# Patient Record
Sex: FEMALE | Race: OTHER | NOT HISPANIC OR LATINO | Employment: UNEMPLOYED | URBAN - METROPOLITAN AREA
[De-identification: names, ages, dates, MRNs, and addresses within clinical notes are randomized per-mention and may not be internally consistent; named-entity substitution may affect disease eponyms.]

---

## 2020-02-25 ENCOUNTER — APPOINTMENT (OUTPATIENT)
Dept: RADIOLOGY | Facility: CLINIC | Age: 60
End: 2020-02-25
Payer: COMMERCIAL

## 2020-02-25 ENCOUNTER — OFFICE VISIT (OUTPATIENT)
Dept: OBGYN CLINIC | Facility: CLINIC | Age: 60
End: 2020-02-25
Payer: COMMERCIAL

## 2020-02-25 VITALS
RESPIRATION RATE: 18 BRPM | HEART RATE: 76 BPM | BODY MASS INDEX: 32.39 KG/M2 | HEIGHT: 62 IN | WEIGHT: 176 LBS | SYSTOLIC BLOOD PRESSURE: 114 MMHG | DIASTOLIC BLOOD PRESSURE: 71 MMHG

## 2020-02-25 DIAGNOSIS — M25.511 RIGHT SHOULDER PAIN, UNSPECIFIED CHRONICITY: ICD-10-CM

## 2020-02-25 DIAGNOSIS — M54.12 RADICULOPATHY, CERVICAL REGION: Primary | ICD-10-CM

## 2020-02-25 PROCEDURE — 73030 X-RAY EXAM OF SHOULDER: CPT

## 2020-02-25 PROCEDURE — 99203 OFFICE O/P NEW LOW 30 MIN: CPT | Performed by: ORTHOPAEDIC SURGERY

## 2020-02-25 RX ORDER — EMPAGLIFLOZIN 25 MG/1
TABLET, FILM COATED ORAL
COMMUNITY
Start: 2020-02-11

## 2020-02-25 RX ORDER — GLIMEPIRIDE 1 MG/1
1 TABLET ORAL
COMMUNITY
Start: 2020-01-31

## 2020-02-25 RX ORDER — LOSARTAN POTASSIUM 100 MG/1
100 TABLET ORAL DAILY
COMMUNITY
Start: 2020-02-04

## 2020-02-25 RX ORDER — HYDROCHLOROTHIAZIDE 12.5 MG/1
12.5 TABLET ORAL DAILY
COMMUNITY
Start: 2020-02-04

## 2020-02-25 RX ORDER — SIMVASTATIN 10 MG
10 TABLET ORAL EVERY EVENING
COMMUNITY
Start: 2020-01-31

## 2020-02-25 RX ORDER — OMEPRAZOLE 20 MG/1
20 CAPSULE, DELAYED RELEASE ORAL DAILY
COMMUNITY
Start: 2020-01-31

## 2020-02-25 RX ORDER — SITAGLIPTIN AND METFORMIN HYDROCHLORIDE 1000; 50 MG/1; MG/1
1 TABLET, FILM COATED ORAL 2 TIMES DAILY WITH MEALS
COMMUNITY
Start: 2020-02-11

## 2020-02-25 RX ORDER — MELOXICAM 15 MG/1
15 TABLET ORAL DAILY
Qty: 30 TABLET | Refills: 0 | Status: SHIPPED | OUTPATIENT
Start: 2020-02-25 | End: 2020-03-19

## 2020-02-25 NOTE — PROGRESS NOTES
HPI:  Patient is a 61y o  year old RHD female who presents with chief complaint of right shoulder pain that radiates to her elbow  Pt states that her pain increases when she reaches behind her back and lifts heavy objects  Patient states that this pain has been going on for approximately 5 months  Patient states that at times she has more motion than others  Patient states that she also has pain on the right side of her neck with motion  Patient states she is taking ibuprofen for pain which often relieves her symptoms  Patient denies injury  Patient denies previous surgery  Patient denies numbness and tingling        Patient is diabetic    ROS:   General: No fever, no chills, no weight loss, no weight gain  HEENT:  No loss of hearing, no nose bleeds, no sore throat  Eyes:  No eye pain, no red eyes, no visual disturbance  Respiratory:  No cough, no shortness of breath, no wheezing  Cardiovascular:  No chest pain, no palpitations, no edema  GI: No abdominal pain, no nausea, no vomiting  Endocrine: No frequent urination, no excessive thirst  Urinary:  No dysuria, no hematuria, no incontinence  Musculoskeletal: see HPI and PE  Skin:  No rash, no wounds  Neurological:  No dizziness, no headache, no numbness  Psychiatric:  No difficulty concentrating, no depression, no suicide thoughts, no anxiety  Review of all other systems is negative    PMH:  Past Medical History:   Diagnosis Date    Diabetes insipidus ([facility] [address] )     Hypertension     Stomach disorder        PSH:  Past Surgical History:   Procedure Laterality Date    HAND SURGERY Right 2010       Medications:  Current Outpatient Medications   Medication Sig Dispense Refill    aspirin 81 MG tablet Take 81 mg by mouth daily      glimepiride (AMARYL) 1 mg tablet Take 1 mg by mouth daily before breakfast      hydrochlorothiazide (HYDRODIURIL) 12 5 mg tablet Take 12 5 mg by mouth daily      JANUMET  MG per tablet Take 1 tablet by mouth 2 (two) times a day with meals      JARDIANCE 25 MG TABS TAKE 25 MG BY MOUTH DAILY   losartan (COZAAR) 100 MG tablet Take 100 mg by mouth daily      omeprazole (PriLOSEC) 20 mg delayed release capsule Take 20 mg by mouth daily      simvastatin (ZOCOR) 10 mg tablet Take 10 mg by mouth every evening      meloxicam (MOBIC) 15 mg tablet Take 1 tablet (15 mg total) by mouth daily 30 tablet 0     No current facility-administered medications for this visit  Allergies:  No Known Allergies    Family History:  Family History   Problem Relation Age of Onset    Cancer Mother     Diabetes Father     Hypertension Father        Social History:  Social History     Occupational History    Not on file   Tobacco Use    Smoking status: Never Smoker    Smokeless tobacco: Never Used   Substance and Sexual Activity    Alcohol use: Never     Frequency: Never    Drug use: Never    Sexual activity: Yes     Partners: Male       Physical Exam:  General :  Alert, cooperative, no distress, appears stated age  Blood pressure 114/71, pulse 76, resp  rate 18, height 5' 2" (1 575 m), weight 79 8 kg (176 lb)  Head:  Normocephalic, without obvious abnormality, atraumatic   Eyes:  Conjunctiva/corneas clear, EOM's intact,   Ears: Both ears normal appearance, no hearing deficits      Nose: Nares normal, septum midline, no drainage    Neck: Supple,  trachea midline, no adenopathy, no tenderness, no mass   Back:   Symmetric, no curvature, ROM normal, no tenderness   Lungs:   Respirations unlabored   Chest Wall:  No tenderness or deformity   Extremities: Extremities normal, atraumatic, no cyanosis or edema      Pulses: 2+ and symmetric   Skin: Skin color, texture, turgor normal, no rashes or lesions      Neurologic: Normal           Right Shoulder Exam     Comments:  Trapezius pain with resisted external rotation and internal rotation  No pain with abduction  5/5 strength with internal rotation external rotation and abduction  Reflexes 1+ bilaterally  Trapezius pain with motion of the neck  5/5 strength with flexion of the elbow  4/5 strength with extension of the elbow with right elbow pain  No pain with active motion of the elbow  5/5 strength  wrist flexion and extension  Positive abduction relief sign  Complain of trapezial discomfort with Pop test             Imaging Studies: The following imaging studies were reviewed in office today  My findings are noted  X-rays of the right shoulder show no acute fractures or dislocations, no significant degenerative changes    Assessment  Encounter Diagnoses   Name Primary?  Right shoulder pain, unspecified chronicity     Radiculopathy, cervical region Yes         Plan:    X-rays were reviewed and discussed with patient  After exam patient was advised that most of her pain is likely coming from cervical radiculopathy  Therapy order was placed for trapezius pain as well as cervical radiculopathy  Patient was advised to take order with her to previously scheduled therapy  15 mg Mobic was ordered for patient to take once daily  Patient will follow up in the office with Dr Aaron Roy for evaluation of her cervical spine    Patient will follow up in the office in 4 weeks in regards to her trapezius pain      Scribe Attestation    I,:   Kristen Reyes am acting as a scribe while in the presence of the attending physician :        I,:   Mu Burleson MD personally performed the services described in this documentation    as scribed in my presence :

## 2020-02-25 NOTE — LETTER
February 26, 2020     Patti Blanc MD  [address] [facility]    Patient: Betsy Abreu   YOB: 1960   Date of Visit: 2/25/2020       Dear Dr Valentín Ruiz: Thank you for referring Betsy Abreu to me for evaluation  Below are my notes for this consultation  If you have questions, please do not hesitate to call me  I look forward to following your patient along with you  Sincerely,        Yohan Mares MD        CC: No Recipients  Yohan Mares MD  2/25/2020  5:18 PM  Signed  HPI:  Patient is a 61y o  year old RHD female who presents with chief complaint of right shoulder pain that radiates to her elbow  Pt states that her pain increases when she reaches behind her back and lifts heavy objects  Patient states that this pain has been going on for approximately 5 months  Patient states that at times she has more motion than others  Patient states that she also has pain on the right side of her neck with motion  Patient states she is taking ibuprofen for pain which often relieves her symptoms  Patient denies injury  Patient denies previous surgery  Patient denies numbness and tingling        Patient is diabetic    ROS:   General: No fever, no chills, no weight loss, no weight gain  HEENT:  No loss of hearing, no nose bleeds, no sore throat  Eyes:  No eye pain, no red eyes, no visual disturbance  Respiratory:  No cough, no shortness of breath, no wheezing  Cardiovascular:  No chest pain, no palpitations, no edema  GI: No abdominal pain, no nausea, no vomiting  Endocrine: No frequent urination, no excessive thirst  Urinary:  No dysuria, no hematuria, no incontinence  Musculoskeletal: see HPI and PE  Skin:  No rash, no wounds  Neurological:  No dizziness, no headache, no numbness  Psychiatric:  No difficulty concentrating, no depression, no suicide thoughts, no anxiety  Review of all other systems is negative    PMH:  Past Medical History:   Diagnosis Date    [facility] insipidus ([facility] [address] )     Hypertension     Stomach disorder        PSH:  Past Surgical History:   Procedure Laterality Date    HAND SURGERY Right 2010       Medications:  Current Outpatient Medications   Medication Sig Dispense Refill    aspirin 81 MG tablet Take 81 mg by mouth daily      glimepiride (AMARYL) 1 mg tablet Take 1 mg by mouth daily before breakfast      hydrochlorothiazide (HYDRODIURIL) 12 5 mg tablet Take 12 5 mg by mouth daily      JANUMET  MG per tablet Take 1 tablet by mouth 2 (two) times a day with meals      JARDIANCE 25 MG TABS TAKE 25 MG BY MOUTH DAILY   losartan (COZAAR) 100 MG tablet Take 100 mg by mouth daily      omeprazole (PriLOSEC) 20 mg delayed release capsule Take 20 mg by mouth daily      simvastatin (ZOCOR) 10 mg tablet Take 10 mg by mouth every evening      meloxicam (MOBIC) 15 mg tablet Take 1 tablet (15 mg total) by mouth daily 30 tablet 0     No current facility-administered medications for this visit  Allergies:  No Known Allergies    Family History:  Family History   Problem Relation Age of Onset    Cancer Mother     Diabetes Father     Hypertension Father        Social History:  Social History     Occupational History    Not on file   Tobacco Use    Smoking status: Never Smoker    Smokeless tobacco: Never Used   Substance and Sexual Activity    Alcohol use: Never     Frequency: Never    Drug use: Never    Sexual activity: Yes     Partners: Male       Physical Exam:  General :  Alert, cooperative, no distress, appears stated age  Blood pressure 114/71, pulse 76, resp  rate 18, height 5' 2" (1 575 m), weight 79 8 kg (176 lb)  Head:  Normocephalic, without obvious abnormality, atraumatic   Eyes:  Conjunctiva/corneas clear, EOM's intact,   Ears: Both ears normal appearance, no hearing deficits      Nose: Nares normal, septum midline, no drainage    Neck: Supple,  trachea midline, no adenopathy, no tenderness, no mass   Back:   Symmetric, no curvature, ROM normal, no tenderness   Lungs:   Respirations unlabored   Chest Wall:  No tenderness or deformity   Extremities: Extremities normal, atraumatic, no cyanosis or edema      Pulses: 2+ and symmetric   Skin: Skin color, texture, turgor normal, no rashes or lesions      Neurologic: Normal           Right Shoulder Exam     Comments:  Trapezius pain with resisted external rotation and internal rotation  No pain with abduction  5/5 strength with internal rotation external rotation and abduction  Reflexes 1+ bilaterally  Trapezius pain with motion of the neck  5/5 strength with flexion of the elbow  4/5 strength with extension of the elbow with right elbow pain  No pain with active motion of the elbow  5/5 strength  wrist flexion and extension  Positive abduction relief sign  Complain of trapezial discomfort with Pop test             Imaging Studies: The following imaging studies were reviewed in office today  My findings are noted  X-rays of the right shoulder show no acute fractures or dislocations, no significant degenerative changes    Assessment  Encounter Diagnoses   Name Primary?  Right shoulder pain, unspecified chronicity     Radiculopathy, cervical region Yes         Plan:    X-rays were reviewed and discussed with patient  After exam patient was advised that most of her pain is likely coming from cervical radiculopathy  Therapy order was placed for trapezius pain as well as cervical radiculopathy  Patient was advised to take order with her to previously scheduled therapy  15 mg Mobic was ordered for patient to take once daily  Patient will follow up in the office with Dr Jesus Carmen for evaluation of her cervical spine    Patient will follow up in the office in 4 weeks in regards to her trapezius pain      Scribe Attestation    I,:   Telma Polo am acting as a scribe while in the presence of the attending physician :        I,:   Betty Fajardo MD personally performed the services described in this documentation    as scribed in my presence :

## 2020-02-27 ENCOUNTER — EVALUATION (OUTPATIENT)
Dept: PHYSICAL THERAPY | Facility: CLINIC | Age: 60
End: 2020-02-27
Payer: COMMERCIAL

## 2020-02-27 DIAGNOSIS — G89.29 CHRONIC RIGHT SHOULDER PAIN: Primary | ICD-10-CM

## 2020-02-27 DIAGNOSIS — M25.511 CHRONIC RIGHT SHOULDER PAIN: Primary | ICD-10-CM

## 2020-02-27 PROCEDURE — 97162 PT EVAL MOD COMPLEX 30 MIN: CPT | Performed by: PHYSICAL THERAPIST

## 2020-02-27 PROCEDURE — 97110 THERAPEUTIC EXERCISES: CPT | Performed by: PHYSICAL THERAPIST

## 2020-02-27 NOTE — PROGRESS NOTES
PT Evaluation     Today's date: 2020  Patient name: Tiana Dickey  : 1960  MRN: [id number]  Referring provider: Eusebio Driver,*  Dx:   Encounter Diagnosis     ICD-10-CM    1  Chronic right shoulder pain M25 511     G89 29        Start Time: 1400  Stop Time: 1435  Total time in clinic (min): 35 minutes    Assessment  Assessment details: Pt is a 60 y/o female presenting to physical therapy with chief complaint of R shoulder pain that began about 5 months ago after moving  Pt has decreased R shoulder A/PROM and strength  Her lift-off and full can tests are (+), suggesting a rotator cuff pathology, and a (+) Kareem's suggesting impingement  Pt's poor posture is likely contributing to her impingement  Pt would benefit from physical therapy in order to improve pain, A/PROM, strength, TTP, and overall function to return to PLOF  Impairments: abnormal or restricted ROM, activity intolerance, impaired physical strength, lacks appropriate home exercise program, pain with function and poor posture   Functional limitations: lifting, carrying, housework  Symptom irritability: moderateUnderstanding of Dx/Px/POC: good   Prognosis: good    Goals  STG: 3 weeks  1  Pt will demonstrate independence with HEP  2  Pt will improve R shoulder AROM by at least 10%  3  Pt will improve R shoulder strength by at least 1/2 grade  4  Pt will report pain no more than 5/10  5  Pt will have minimal TTP over R supraspinatus tendon    LT weeks  1  Pt will improve R shoulder AROM to equal that on the L to return to PLOF  2  Pt will improve R shoulder strength to at least 4+/5  3  Pt will be able to perform heavy household chores without limitations  4  Pt will have no difficulty reaching OH  5   Pt will report pain no more than 2/10      Plan  Patient would benefit from: skilled physical therapy  Planned modality interventions: cryotherapy and thermotherapy: hydrocollator packs  Planned therapy interventions: therapeutic exercise, therapeutic activities, stretching, strengthening, patient education, neuromuscular re-education, massage, manual therapy, balance, gait training and home exercise program  Frequency: 2x week  Duration in weeks: 6  Treatment plan discussed with: patient        Subjective Evaluation    History of Present Illness  Mechanism of injury: Pt reports her R shoulder pain began about 5 months ago when she was moving into her new house  Pt reports that heavy household work increases her shoulder pain  Pt reports she can reach [address] a little bit, but reaching too high is painful and difficult  She reports she is able to lay on the R side but it is painful  Recurrent probem    Quality of life: good    Pain  Current pain ratin  At best pain ratin  At worst pain ratin  Quality: discomfort, dull ache and burning  Relieving factors: medications  Aggravating factors: lifting and overhead activity  Progression: no change    Patient Goals  Patient goals for therapy: decreased pain, independence with ADLs/IADLs, increased strength and increased motion          Objective     Tenderness     Right Shoulder  Tenderness in the infraspinatus tendon and supraspinatus tendon  Cervical/Thoracic Screen   Cervical range of motion within normal limits with the following exceptions: Cervical AROM full and painfree  Cervical joint play WNL and minimally TTP on the R    Active Range of Motion   Left Shoulder   Flexion: 145 degrees   Abduction: WFL  External rotation 0°: 35 degrees     Right Shoulder   Flexion: 140 degrees with pain  Abduction: 123 degrees with pain  External rotation 0°: 35 degrees     Joint Play     Right Shoulder  Joints within functional limits are the posterior capsule and inferior capsule       Additional Joint Play Details  Painful with A-P GH joint mobs    Strength/Myotome Testing     Left Shoulder   Normal muscle strength    Right Shoulder     Planes of Motion   Flexion: 4-   Abduction: 4- External rotation at 0°: 4-   Internal rotation at 0°: 4-     Tests     Right Shoulder   Positive full can, lift-off and Kareem's  Negative belly press                Precautions: DM, HTN      Manual  2/27            R shoulder PROM             UT STM 5'                                                       Exercise Diary  2/27            Pulleys             UBE             TB rows/ext             [facility] flex/abd/er                                                                                                                                                                                                                                 Modalities

## 2020-02-27 NOTE — LETTER
2020    Mariama Ernst MD  [address] [facility]    Patient: Marcos Alvarenga   YOB: 1960   Date of Visit: 2020     Encounter Diagnosis     ICD-10-CM    1  Chronic right shoulder pain M25 511     G89 29        Dear Dr Lluvia Burch: Thank you for your recent referral of Marcos Alvarenga  Please review the attached evaluation summary from [address] [place] [address] recent visit  Please verify that you agree with the plan of care by signing the attached order  If you have any questions or concerns, please do not hesitate to call  I sincerely appreciate the opportunity to share in the care of one of your patients and hope to have another opportunity to work with you in the near future  Sincerely,    Chasidy Oconnor, PT      Referring Provider:      I certify that I have read the below Plan of Care and certify the need for these services furnished under this plan of treatment while under my care  Mariama Ernst MD  [address]   [address] [place], [place] [address]vard: [phone]          PT Evaluation     Today's date: 2020  Patient name: Marcos Alvarenga  : 1960  MRN: [id number]  Referring provider: Elsie Jackson,*  Dx:   Encounter Diagnosis     ICD-10-CM    1  Chronic right shoulder pain M25 511     G89 29        Start Time: 1400  Stop Time: 1435  Total time in clinic (min): 35 minutes    Assessment  Assessment details: Pt is a 62 y/o female presenting to physical therapy with chief complaint of R shoulder pain that began about 5 months ago after moving  Pt has decreased R shoulder A/PROM and strength  Her lift-off and full can tests are (+), suggesting a rotator cuff pathology, and a (+) Kareem's suggesting impingement  Pt's poor posture is likely contributing to her impingement  Pt would benefit from physical therapy in order to improve pain, A/PROM, strength, TTP, and overall function to return to PLOF    Impairments: abnormal or restricted ROM, activity intolerance, impaired physical strength, lacks appropriate home exercise program, pain with function and poor posture   Functional limitations: lifting, carrying, housework  Symptom irritability: moderateUnderstanding of Dx/Px/POC: good   Prognosis: good    Goals  STG: 3 weeks  1  Pt will demonstrate independence with HEP  2  Pt will improve R shoulder AROM by at least 10%  3  Pt will improve R shoulder strength by at least 1/2 grade  4  Pt will report pain no more than 5/10  5  Pt will have minimal TTP over R supraspinatus tendon    LT weeks  1  Pt will improve R shoulder AROM to equal that on the L to return to PLOF  2  Pt will improve R shoulder strength to at least 4+/5  3  Pt will be able to perform heavy household chores without limitations  4  Pt will have no difficulty reaching OH  5  Pt will report pain no more than 2/10      Plan  Patient would benefit from: skilled physical therapy  Planned modality interventions: cryotherapy and thermotherapy: hydrocollator packs  Planned therapy interventions: therapeutic exercise, therapeutic activities, stretching, strengthening, patient education, neuromuscular re-education, massage, manual therapy, balance, gait training and home exercise program  Frequency: 2x week  Duration in weeks: 6  Treatment plan discussed with: patient        Subjective Evaluation    History of Present Illness  Mechanism of injury: Pt reports her R shoulder pain began about 5 months ago when she was moving into her new house  Pt reports that heavy household work increases her shoulder pain  Pt reports she can reach [facility] a little bit, but reaching too high is painful and difficult  She reports she is able to lay on the R side but it is painful             Recurrent probem    Quality of life: good    Pain  Current pain ratin  At best pain ratin  At worst pain ratin  Quality: discomfort, dull ache and burning  Relieving factors: medications  Aggravating factors: lifting and overhead activity  Progression: no change    Patient Goals  Patient goals for therapy: decreased pain, independence with ADLs/IADLs, increased strength and increased motion          Objective     Tenderness     Right Shoulder  Tenderness in the infraspinatus tendon and supraspinatus tendon  Cervical/Thoracic Screen   Cervical range of motion within normal limits with the following exceptions: Cervical AROM full and painfree  Cervical joint play WNL and minimally TTP on the R    Active Range of Motion   Left Shoulder   Flexion: 145 degrees   Abduction: WFL  External rotation 0°:  35 degrees     Right Shoulder   Flexion: 140 degrees with pain  Abduction: 123 degrees with pain  External rotation 0°:  35 degrees     Joint Play     Right Shoulder  Joints within functional limits are the posterior capsule and inferior capsule  Additional Joint Play Details  Painful with A-P GH joint mobs    Strength/Myotome Testing     Left Shoulder   Normal muscle strength    Right Shoulder     Planes of Motion   Flexion: 4-   Abduction: 4-   External rotation at 0°:  4-   Internal rotation at 0°:  4-     Tests     Right Shoulder   Positive full can, lift-off and Kareem's  Negative belly press                Precautions: DM, HTN      Manual  2/27            R shoulder PROM             UT STM 5'                                                       Exercise Diary  2/27            Pulleys             UBE             TB rows/ext             [facility] flex/abd/er                                                                                                                                                                                                                                 Modalities

## 2020-03-03 ENCOUNTER — OFFICE VISIT (OUTPATIENT)
Dept: PHYSICAL THERAPY | Facility: CLINIC | Age: 60
End: 2020-03-03
Payer: COMMERCIAL

## 2020-03-03 DIAGNOSIS — G89.29 CHRONIC RIGHT SHOULDER PAIN: Primary | ICD-10-CM

## 2020-03-03 DIAGNOSIS — M25.511 CHRONIC RIGHT SHOULDER PAIN: Primary | ICD-10-CM

## 2020-03-03 PROCEDURE — 97110 THERAPEUTIC EXERCISES: CPT | Performed by: PHYSICAL THERAPIST

## 2020-03-03 PROCEDURE — 97140 MANUAL THERAPY 1/> REGIONS: CPT | Performed by: PHYSICAL THERAPIST

## 2020-03-03 PROCEDURE — 97112 NEUROMUSCULAR REEDUCATION: CPT | Performed by: PHYSICAL THERAPIST

## 2020-03-03 NOTE — PROGRESS NOTES
Daily Note     Today's date: 3/3/2020  Patient name: Andrea Santana  : 1960  MRN: [id number]  Referring provider: Leah Longoria,*  Dx:   Encounter Diagnosis     ICD-10-CM    1  Chronic right shoulder pain M25 511     G89 29        Start Time: 1026  Stop Time: 1057  Total time in clinic (min): 31 minutes    Subjective: "My shoulder is feeling much better"      Objective: See treatment diary below      Assessment: Improvement in tolerance to STM of the R UT today compared to IE  Pt able to achieve full range flex AAROM with cane, however pain limited her with abduction and ER AAROM with cane  Pt able to perform periscapular strengthening exercises without pain  Plan: Progress as tolerated       Precautions: DM, HTN      Manual  2/27 3/3           R shoulder PROM             UT STM 5' 12'                                                      Exercise Diary  2/27 3/3           Pulleys             UBE  3'/3'           TB rows/ext  RTB 20x ea           Cane flex/abd/er  10"x10                                                                                                                                                                                                                               Modalities

## 2020-03-05 ENCOUNTER — OFFICE VISIT (OUTPATIENT)
Dept: PHYSICAL THERAPY | Facility: CLINIC | Age: 60
End: 2020-03-05
Payer: COMMERCIAL

## 2020-03-05 DIAGNOSIS — M25.511 CHRONIC RIGHT SHOULDER PAIN: Primary | ICD-10-CM

## 2020-03-05 DIAGNOSIS — G89.29 CHRONIC RIGHT SHOULDER PAIN: Primary | ICD-10-CM

## 2020-03-05 PROCEDURE — 97112 NEUROMUSCULAR REEDUCATION: CPT

## 2020-03-05 PROCEDURE — 97140 MANUAL THERAPY 1/> REGIONS: CPT

## 2020-03-05 NOTE — PROGRESS NOTES
Daily Note     Today's date: 3/5/2020  Patient name: Sophie Torres  : 1960  MRN: [id number]  Referring provider: Mark Encarnacion,*  Dx:   Encounter Diagnosis     ICD-10-CM    1  Chronic right shoulder pain M25 511     G89 29                   Subjective: Patient states R shoulder is feeling better  Continues to have burning throughout RUE with house chores when lifting RUE overhead  Objective: See treatment diary below      Assessment: Patient was tender to palpation along RUT  Requires verbal and tactile cuing in order to facilitate proper exercise form and dosage  Pt had difficulty activating serratus  No c/o pain or discomfort with cane flexion overhead  Plan: Continue per plan of care        Precautions: DM, HTN      Manual  2/27 3/3 3/5          R shoulder PROM             UT STM 5' 12' 10'                                                     Exercise Diary  2/27 3/3 3/5          Pulleys   3'          UBE  3'/3'           TB rows/ext  RTB 20x ea RTB x20 ea          Cane flex/abd/er  10"x10 10"x          scap wall slides   x20          C/s retractions   x20          Serratus punch   unable                                                                                                                                                                                       Modalities

## 2020-03-09 ENCOUNTER — TRANSCRIBE ORDERS (OUTPATIENT)
Dept: PHYSICAL THERAPY | Facility: CLINIC | Age: 60
End: 2020-03-09

## 2020-03-09 DIAGNOSIS — G89.29 CHRONIC RIGHT SHOULDER PAIN: Primary | ICD-10-CM

## 2020-03-09 DIAGNOSIS — M25.511 CHRONIC RIGHT SHOULDER PAIN: Primary | ICD-10-CM

## 2020-03-10 ENCOUNTER — APPOINTMENT (OUTPATIENT)
Dept: PHYSICAL THERAPY | Facility: CLINIC | Age: 60
End: 2020-03-10
Payer: COMMERCIAL

## 2020-03-12 ENCOUNTER — APPOINTMENT (OUTPATIENT)
Dept: PHYSICAL THERAPY | Facility: CLINIC | Age: 60
End: 2020-03-12
Payer: COMMERCIAL

## 2020-03-17 ENCOUNTER — OFFICE VISIT (OUTPATIENT)
Dept: PHYSICAL THERAPY | Facility: CLINIC | Age: 60
End: 2020-03-17
Payer: COMMERCIAL

## 2020-03-17 DIAGNOSIS — M25.511 CHRONIC RIGHT SHOULDER PAIN: Primary | ICD-10-CM

## 2020-03-17 DIAGNOSIS — G89.29 CHRONIC RIGHT SHOULDER PAIN: Primary | ICD-10-CM

## 2020-03-17 PROCEDURE — 97140 MANUAL THERAPY 1/> REGIONS: CPT

## 2020-03-17 PROCEDURE — 97110 THERAPEUTIC EXERCISES: CPT

## 2020-03-17 PROCEDURE — 97112 NEUROMUSCULAR REEDUCATION: CPT

## 2020-03-17 NOTE — PROGRESS NOTES
Daily Note     Today's date: 3/17/2020  Patient name: Vivienne Gabriel  : 1960  MRN: [id number]  Referring provider: Eris Raygoza,*  Dx:   Encounter Diagnosis     ICD-10-CM    1  Chronic right shoulder pain M25 511     G89 29                   Subjective: Pt states she was in "very much" pain in RUT region yesterday and today  Objective: See treatment diary below      Assessment: Able to perform serratus punch today with decreased difficulty  Pt did require tactile cuing in order to facilitate this motion  Presents with decreased motor control of periscapular and cervical regions  No change in sx post session  Plan: Continue per plan of care        Precautions: DM, HTN      Manual  2/27 3/3 3/5 3/17         R shoulder PROM             UT STM 5' 12' 10' 10'                                                    Exercise Diary  2/27 3/3 3/5 3/17         Pulleys   3'          UBE  3'/3'  5'f/5'b         TB rows/ext  RTB 20x ea RTB x20 ea RTB 20         Cane flex/abd/er  10"x10 10"x 10"x10         scap wall slides   x20 x20         C/s retractions   x20 x20         Serratus punch   unable x20         SL ER    x20                                                                                                                                                                         Modalities

## 2020-03-19 ENCOUNTER — OFFICE VISIT (OUTPATIENT)
Dept: PHYSICAL THERAPY | Facility: CLINIC | Age: 60
End: 2020-03-19
Payer: COMMERCIAL

## 2020-03-19 DIAGNOSIS — M25.511 CHRONIC RIGHT SHOULDER PAIN: Primary | ICD-10-CM

## 2020-03-19 DIAGNOSIS — G89.29 CHRONIC RIGHT SHOULDER PAIN: Primary | ICD-10-CM

## 2020-03-19 DIAGNOSIS — M54.12 RADICULOPATHY, CERVICAL REGION: ICD-10-CM

## 2020-03-19 DIAGNOSIS — M25.511 RIGHT SHOULDER PAIN, UNSPECIFIED CHRONICITY: ICD-10-CM

## 2020-03-19 PROCEDURE — 97112 NEUROMUSCULAR REEDUCATION: CPT | Performed by: PHYSICAL THERAPIST

## 2020-03-19 PROCEDURE — 97140 MANUAL THERAPY 1/> REGIONS: CPT | Performed by: PHYSICAL THERAPIST

## 2020-03-19 PROCEDURE — 97110 THERAPEUTIC EXERCISES: CPT | Performed by: PHYSICAL THERAPIST

## 2020-03-19 RX ORDER — MELOXICAM 15 MG/1
TABLET ORAL
Qty: 30 TABLET | Refills: 0 | Status: SHIPPED | OUTPATIENT
Start: 2020-03-19

## 2020-03-19 NOTE — PROGRESS NOTES
Daily Note     Today's date: 3/19/2020  Patient name: Kamla Herman  : 1960  MRN: [id number]  Referring provider: Tracie Hilliard,*  Dx:   Encounter Diagnosis     ICD-10-CM    1  Chronic right shoulder pain M25 511     G89 29        Start Time: 1057  Stop Time: 1137  Total time in clinic (min): 40 minutes    Subjective: Pt reports she feels like her shoulder is getting better  Objective: See treatment diary below      Assessment: Pt continues to have increased tightness in the R UT, improved with STM  Mod TC for proper form during serratus punches and during rows  Pt's R shoulder AROM improved following STM  Plan: Progress as tolerated       Precautions: DM, HTN      Manual  2/27 3/3 3/5 3/17 3/19        R shoulder PROM             UT STM 5' 12' 10' 10' 15'                                                   Exercise Diary  2/27 3/3 3/5 3/17 3/19        Pulleys   3'          UBE  3'/3'  5'f/5'b 4'/4'        TB rows/ext  RTB 20x ea RTB x20 ea RTB 20 RTB 20x        Cane flex/abd/er  10"x10 10"x 10"x10 10"x10        scap wall slides   x20 x20         C/s retractions   x20 x20 20x        Serratus punch   unable x20 20x        SL ER    x20 20x        TB IR/ER     RTB 20x ea                                                                                                                                                           Modalities

## 2020-03-24 ENCOUNTER — APPOINTMENT (OUTPATIENT)
Dept: PHYSICAL THERAPY | Facility: CLINIC | Age: 60
End: 2020-03-24
Payer: COMMERCIAL

## 2020-03-26 ENCOUNTER — APPOINTMENT (OUTPATIENT)
Dept: PHYSICAL THERAPY | Facility: CLINIC | Age: 60
End: 2020-03-26
Payer: COMMERCIAL

## 2020-03-31 ENCOUNTER — APPOINTMENT (OUTPATIENT)
Dept: PHYSICAL THERAPY | Facility: CLINIC | Age: 60
End: 2020-03-31
Payer: COMMERCIAL

## 2020-04-13 NOTE — PROGRESS NOTES
PT Discharge    Today's date: 2020  Patient name: Tiana Dickey  : 1960  MRN: [id number]  Referring provider: Eusebio Driver,*  Dx:   Encounter Diagnosis     ICD-10-CM    1  Chronic right shoulder pain M25 511     G89 29      Assessment  Assessment details: Pt cancelled all future appointments secondary to concern about COVID-19  Subjective and objective information, and goals, unable to be updated at this time  Pt DC from skilled therapy  Impairments: abnormal or restricted ROM, activity intolerance, impaired physical strength, lacks appropriate home exercise program, pain with function and poor posture   Functional limitations: lifting, carrying, housework  Symptom irritability: moderateUnderstanding of Dx/Px/POC: good   Prognosis: good    Goals  STG: 3 weeks - not met  1  Pt will demonstrate independence with HEP  2  Pt will improve R shoulder AROM by at least 10%  3  Pt will improve R shoulder strength by at least 1/2 grade  4  Pt will report pain no more than 5/10  5  Pt will have minimal TTP over R supraspinatus tendon    LT weeks - not met  1  Pt will improve R shoulder AROM to equal that on the L to return to PLOF  2  Pt will improve R shoulder strength to at least 4+/5  3  Pt will be able to perform heavy household chores without limitations  4  Pt will have no difficulty reaching OH  5   Pt will report pain no more than 2/10      Plan  Patient would benefit from: skilled physical therapy  Planned modality interventions: cryotherapy and thermotherapy: hydrocollator packs  Planned therapy interventions: therapeutic exercise, therapeutic activities, stretching, strengthening, patient education, neuromuscular re-education, massage, manual therapy, balance, gait training and home exercise program  Treatment plan discussed with: patient        Subjective Evaluation    History of Present Illness  Mechanism of injury: Pt reports her R shoulder pain began about 5 months ago when she was moving into her new house  Pt reports that heavy household work increases her shoulder pain  Pt reports she can reach [facility] a little bit, but reaching too high is painful and difficult  She reports she is able to lay on the R side but it is painful  Recurrent probem    Quality of life: good    Pain  Current pain ratin  At best pain ratin  At worst pain ratin  Quality: discomfort, dull ache and burning  Relieving factors: medications  Aggravating factors: lifting and overhead activity  Progression: no change    Patient Goals  Patient goals for therapy: decreased pain, independence with ADLs/IADLs, increased strength and increased motion          Objective     Tenderness     Right Shoulder  Tenderness in the infraspinatus tendon and supraspinatus tendon  Cervical/Thoracic Screen   Cervical range of motion within normal limits with the following exceptions: Cervical AROM full and painfree  Cervical joint play WNL and minimally TTP on the R    Active Range of Motion   Left Shoulder   Flexion: 145 degrees   Abduction: WFL  External rotation 0°: 35 degrees     Right Shoulder   Flexion: 140 degrees with pain  Abduction: 123 degrees with pain  External rotation 0°: 35 degrees     Joint Play     Right Shoulder  Joints within functional limits are the posterior capsule and inferior capsule  Additional Joint Play Details  Painful with A-P GH joint mobs    Strength/Myotome Testing     Left Shoulder   Normal muscle strength    Right Shoulder     Planes of Motion   Flexion: 4-   Abduction: 4-   External rotation at 0°: 4-   Internal rotation at 0°: 4-     Tests     Right Shoulder   Positive full can, lift-off and Kareem's  Negative belly press         Flowsheet Rows      Most Recent Value   PT/OT G-Codes   Current Score  77   Projected Score  65

## 2020-04-27 ENCOUNTER — TELEPHONE (OUTPATIENT)
Dept: OBGYN CLINIC | Facility: CLINIC | Age: 60
End: 2020-04-27

## 2021-07-11 ENCOUNTER — HOSPITAL ENCOUNTER (EMERGENCY)
Facility: HOSPITAL | Age: 61
Discharge: HOME | End: 2021-07-11
Attending: EMERGENCY MEDICINE
Payer: COMMERCIAL

## 2021-07-11 VITALS
SYSTOLIC BLOOD PRESSURE: 142 MMHG | DIASTOLIC BLOOD PRESSURE: 62 MMHG | HEART RATE: 92 BPM | OXYGEN SATURATION: 97 % | RESPIRATION RATE: 17 BRPM | TEMPERATURE: 98.2 F

## 2021-07-11 DIAGNOSIS — H60.501 ACUTE OTITIS EXTERNA OF RIGHT EAR, UNSPECIFIED TYPE: Primary | ICD-10-CM

## 2021-07-11 PROCEDURE — 99283 EMERGENCY DEPT VISIT LOW MDM: CPT

## 2021-07-11 PROCEDURE — 99284 EMERGENCY DEPT VISIT MOD MDM: CPT | Performed by: EMERGENCY MEDICINE

## 2021-07-11 RX ORDER — OXYMETAZOLINE HYDROCHLORIDE 0.05 G/100ML
2 SPRAY NASAL ONCE
Status: COMPLETED | OUTPATIENT
Start: 2021-07-11 | End: 2021-07-11

## 2021-07-11 RX ORDER — NEOMYCIN SULFATE, POLYMYXIN B SULFATE AND HYDROCORTISONE 10; 3.5; 1 MG/ML; MG/ML; [USP'U]/ML
4 SUSPENSION/ DROPS AURICULAR (OTIC) EVERY 8 HOURS SCHEDULED
Status: DISCONTINUED | OUTPATIENT
Start: 2021-07-11 | End: 2021-07-11 | Stop reason: HOSPADM

## 2021-07-11 RX ADMIN — NEOMYCIN SULFATE, POLYMYXIN B SULFATE AND HYDROCORTISONE 4 DROP: 10; 3.5; 1 SUSPENSION/ DROPS AURICULAR (OTIC) at 15:37

## 2021-07-11 RX ADMIN — OXYMETAZOLINE HYDROCHLORIDE 2 SPRAY: 0.05 SPRAY NASAL at 15:37

## 2021-07-11 NOTE — ED PROVIDER NOTES
Pt Name: Ameena Porter  MRN: [id number]  [place] 1960  Age/Sex: 64 y o  female  Date of evaluation: 7/11/2021  PCP: Tamiko Forbes MD    [address] [place] [address]    Chief Complaint   Patient presents with    Fever - 9 weeks to 74 years     pt c/o bilateral earache and fevers  pt not sure how high was the fever  HPI    64 y o  female presenting with bilateral earaches possible fevers  Patient states she began having the symptoms 1-2 days ago, states she has been having some dull burning pain in both ears  She also states she felt like she had a fever although she did not measure her temperature  She denies nausea, vomiting, diarrhea, chest pain, shortness of breath, headache, changes in speech or vision, trauma, other symptoms  HPI      Past Medical and Surgical History    Past Medical History:   Diagnosis Date    Diabetes insipidus ([address] )     Hypertension     Stomach disorder        Past Surgical History:   Procedure Laterality Date    HAND SURGERY Right 2010       Family History   Problem Relation Age of Onset    Cancer Mother     Diabetes Father     Hypertension Father        Social History     Tobacco Use    Smoking status: Never Smoker    Smokeless tobacco: Never Used   Vaping Use    Vaping Use: Never used   Substance Use Topics    Alcohol use: Never    Drug use: Never           Allergies    No Known Allergies    Home Medications    Prior to Admission medications    Medication Sig Start Date End Date Taking?  Authorizing Provider   aspirin 81 MG tablet Take 81 mg by mouth daily    Historical Provider, MD   glimepiride (AMARYL) 1 mg tablet Take 1 mg by mouth daily before breakfast 1/31/20   Historical Provider, MD   hydrochlorothiazide (HYDRODIURIL) 12 5 mg tablet Take 12 5 mg by mouth daily 2/4/20   Historical Provider, MD   JANFAIZAN  MG per tablet Take 1 tablet by mouth 2 (two) times a day with meals 2/11/20   Historical Provider, MD   JARDIANCE 25 MG TABS TAKE 25 MG BY MOUTH DAILY  2/11/20   Historical Provider, MD   losartan (COZAAR) 100 MG tablet Take 100 mg by mouth daily 2/4/20   Historical Provider, MD   meloxicam (MOBIC) 15 mg tablet TAKE 1 TABLET BY MOUTH EVERY DAY 3/19/20   Christiano Moore PA-C   omeprazole (PriLOSEC) 20 mg delayed release capsule Take 20 mg by mouth daily 1/31/20   Historical Provider, MD   simvastatin (ZOCOR) 10 mg tablet Take 10 mg by mouth every evening 1/31/20   Historical Provider, MD           Review of Systems    Review of Systems   Constitutional: Positive for fever  Negative for activity change and chills  HENT: Positive for ear pain  Negative for drooling and facial swelling  Eyes: Negative for pain, discharge and visual disturbance  Respiratory: Negative for apnea, cough, chest tightness, shortness of breath and wheezing  Cardiovascular: Negative for chest pain and leg swelling  Gastrointestinal: Negative for abdominal pain, constipation, diarrhea, nausea and vomiting  Genitourinary: Negative for difficulty urinating, dysuria and urgency  Musculoskeletal: Negative for arthralgias, back pain and gait problem  Skin: Negative for color change and rash  Neurological: Negative for dizziness, speech difficulty, weakness and headaches  Psychiatric/Behavioral: Negative for agitation, behavioral problems and confusion  All other systems reviewed and negative  Physical Exam      ED Triage Vitals [07/11/21 1513]   Temperature Pulse Respirations Blood Pressure SpO2   98 2 °F (36 8 °C) 100 18 151/65 95 %      Temp Source Heart Rate Source Patient Position - Orthostatic VS BP Location FiO2 (%)   Oral Monitor Sitting Left arm --      Pain Score       --               Physical Exam  Vitals and nursing note reviewed  Constitutional:       Appearance: She is well-developed  HENT:      Head: Normocephalic and atraumatic        Right Ear: External ear normal       Left Ear: Tympanic membrane, ear canal and external ear normal  Ears:      Comments: Right ear canal irritated with some discharge consistent with otitis externa  Eyes:      Conjunctiva/sclera: Conjunctivae normal       Pupils: Pupils are equal, round, and reactive to light  Cardiovascular:      Rate and Rhythm: Normal rate and regular rhythm  Heart sounds: Normal heart sounds  Pulmonary:      Effort: Pulmonary effort is normal  No respiratory distress  Breath sounds: Normal breath sounds  No wheezing or rales  Abdominal:      General: There is no distension  Palpations: Abdomen is soft  Tenderness: There is no abdominal tenderness  There is no guarding or rebound  Musculoskeletal:         General: No deformity  Normal range of motion  Cervical back: Normal range of motion and neck supple  Skin:     General: Skin is warm and dry  Findings: No erythema or rash  Neurological:      Mental Status: She is alert and oriented to person, place, and time  Psychiatric:         Behavior: Behavior normal          Thought Content: Thought content normal          Judgment: Judgment normal               Diagnostic Results      Labs:    Results Reviewed     None          All labs reviewed and utilized in the medical decision making process    Radiology:    No orders to display       All radiology studies independently viewed by me and interpreted by the radiologist     Procedure    Procedures        ED Course of Care and Re-Assessments      Symptoms improved with oxymetazoline and Cortisporin drops  Medications   oxymetazoline (AFRIN) 0 05 % nasal spray 2 spray (2 sprays Each Nare Given 7/11/21 1537)           FINAL IMPRESSION    Final diagnoses:   Acute otitis externa of right ear, unspecified type         DISPOSITION/PLAN    Presentation as above most consistent with acute otitis externa of the right ear    Low suspicion for sepsis, meningitis, otitis externa malignant, otitis media, deep space abscess, stroke, intracranial hemorrhage, other acute life threat at this time  Treated symptomatically, discharged strict return precautions, follow up primary care doctor  Time reflects when diagnosis was documented in both MDM as applicable and the Disposition within this note     Time User Action Codes Description Comment    7/11/2021  3:32 PM Lisbet Milyut Add [H60 501] Acute otitis externa of right ear, unspecified type       ED Disposition     ED Disposition Condition Date/Time Comment    Discharge Stable Sun Jul 11, 2021  3:32 PM Pellstonora Rangelyosvany discharge to home/self care  Follow-up Information     Follow up With Specialties Details Why Contact Info Additional [address] [facility] Emergency Department Emergency Medicine Go to  If symptoms worsen [facility] [address] [facility] 109 [facility] Emergency Department, [address], [facility], [address] Keny Moralez MD Internal Medicine Go in 2 days As needed to recheck your symptoms [address] 17[address] [facility], [address] Otolaryngology Call  As needed if your symptoms do not get better with the ear drops [address] [facility] 5 [facility], [address] [place] [address]            PATIENT REFERRED TO:    [address] [facility] Emergency Department  [address] [facility] [address] [phone]  Go to   If symptoms worsen    Hetal Menendez MD  [address] [facility] [address]  [phone]    Go in 2 days  As needed to recheck your symptoms    [facility] [address] [place] [address]  [phone]  Call   As needed if your symptoms do not get better with the ear drops      DISCHARGE MEDICATIONS:    Discharge Medication List as of 7/11/2021  3:34 PM      CONTINUE these medications which have NOT CHANGED    Details   aspirin 81 MG tablet Take 81 mg by mouth daily, Historical Med      glimepiride (AMARYL) 1 mg tablet Take 1 mg by mouth daily before breakfast, Starting Fri 1/31/2020, Historical Med      hydrochlorothiazide (HYDRODIURIL) 12 5 mg tablet Take 12 5 mg by mouth daily, Starting Tue 2/4/2020, Historical Med      JANUMET  MG per tablet Take 1 tablet by mouth 2 (two) times a day with meals, Starting Tue 2/11/2020, Historical Med      JARDIANCE 25 MG TABS TAKE 25 MG BY MOUTH DAILY  , Historical Med      losartan (COZAAR) 100 MG tablet Take 100 mg by mouth daily, Starting Tue 2/4/2020, Historical Med      meloxicam (MOBIC) 15 mg tablet TAKE 1 TABLET BY MOUTH EVERY DAY, Normal      omeprazole (PriLOSEC) 20 mg delayed release capsule Take 20 mg by mouth daily, Starting Fri 1/31/2020, Historical Med      simvastatin (ZOCOR) 10 mg tablet Take 10 mg by mouth every evening, Starting Fri 1/31/2020, Historical Med             No discharge procedures on file           MD Misa Benitez MD  07/11/21 2013

## 2022-11-08 ENCOUNTER — HOSPITAL ENCOUNTER (EMERGENCY)
Facility: HOSPITAL | Age: 62
Discharge: HOME | End: 2022-11-08
Attending: EMERGENCY MEDICINE

## 2022-11-08 VITALS
HEART RATE: 68 BPM | TEMPERATURE: 98.1 F | OXYGEN SATURATION: 99 % | RESPIRATION RATE: 18 BRPM | DIASTOLIC BLOOD PRESSURE: 67 MMHG | SYSTOLIC BLOOD PRESSURE: 140 MMHG

## 2022-11-08 DIAGNOSIS — L25.9 CONTACT DERMATITIS: Primary | ICD-10-CM

## 2022-11-08 RX ORDER — PREDNISONE 10 MG/1
TABLET ORAL
Qty: 14 TABLET | Refills: 0 | Status: SHIPPED | OUTPATIENT
Start: 2022-11-08

## 2022-11-11 NOTE — ED PROVIDER NOTES
History  Chief Complaint   Patient presents with   • Rash     Patient c/o itchy red rash that started approximately a week ago  Pt reports rash started on both arms and has spread to her lower abdomen, lower back and bilateral legs  Unable to remember if she was in any bushes lately  Pt states, "I've tried Vaseline with no help"  Patient with itching rash  Started on b/l arms  Has spread to abdomen, low back and b/l thighs  Patient does not specifically recall any contact with allergen  No erythema  No airway involvement, no facial involvement  Spares the hands and feet  No radiation of symptoms  No aggravating or alleviating factors  No other associated symptoms  Moderate in severity  Prior to Admission Medications   Prescriptions Last Dose Informant Patient Reported? Taking? JANUMET  MG per tablet  Self Yes No   Sig: Take 1 tablet by mouth 2 (two) times a day with meals   JARDIANCE 25 MG TABS  Self Yes No   Sig: TAKE 25 MG BY MOUTH DAILY     aspirin 81 MG tablet  Self Yes No   Sig: Take 81 mg by mouth daily   glimepiride (AMARYL) 1 mg tablet  Self Yes No   Sig: Take 1 mg by mouth daily before breakfast   hydrochlorothiazide (HYDRODIURIL) 12 5 mg tablet  Self Yes No   Sig: Take 12 5 mg by mouth daily   losartan (COZAAR) 100 MG tablet  Self Yes No   Sig: Take 100 mg by mouth daily   meloxicam (MOBIC) 15 mg tablet   No No   Sig: TAKE 1 TABLET BY MOUTH EVERY DAY   omeprazole (PriLOSEC) 20 mg delayed release capsule  Self Yes No   Sig: Take 20 mg by mouth daily   simvastatin (ZOCOR) 10 mg tablet  Self Yes No   Sig: Take 10 mg by mouth every evening      Facility-Administered Medications: None       Past Medical History:   Diagnosis Date   • Diabetes insipidus ([facility] [address] )    • Hypertension    • Stomach disorder        Past Surgical History:   Procedure Laterality Date   • HAND SURGERY Right 2010       Family History   Problem Relation Age of Onset   • Cancer Mother    • Diabetes Father    • Hypertension Father      I have reviewed and agree with the history as documented  E-Cigarette/Vaping   • E-Cigarette Use Never User      E-Cigarette/Vaping Substances     Social History     Tobacco Use   • Smoking status: Never Smoker   • Smokeless tobacco: Never Used   Vaping Use   • Vaping Use: Never used   Substance Use Topics   • Alcohol use: Never   • Drug use: Never       Review of Systems   Constitutional: Negative for chills and fever  HENT: Negative for congestion, trouble swallowing and voice change  Eyes: Negative for pain and redness  Respiratory: Negative for cough, chest tightness, shortness of breath, wheezing and stridor  Cardiovascular: Negative for chest pain and palpitations  Gastrointestinal: Negative for nausea and vomiting  Genitourinary: Negative for menstrual problem  Musculoskeletal: Negative for arthralgias, back pain and joint swelling  Skin: Positive for rash  Negative for color change, pallor and wound  Allergic/Immunologic: Negative for environmental allergies and food allergies  Neurological: Negative for dizziness, syncope, facial asymmetry, numbness and headaches  Hematological: Negative for adenopathy  Does not bruise/bleed easily  Psychiatric/Behavioral: Negative for confusion and sleep disturbance  Physical Exam  Physical Exam  Vitals and nursing note reviewed  Constitutional:       General: She is not in acute distress  Appearance: She is well-developed  HENT:      Head: Normocephalic and atraumatic  Nose: Nose normal       Mouth/Throat:      Mouth: Mucous membranes are moist       Pharynx: Oropharynx is clear  Eyes:      General: No scleral icterus  Pupils: Pupils are equal, round, and reactive to light  Neck:      Trachea: No tracheal deviation  Cardiovascular:      Rate and Rhythm: Normal rate  Pulmonary:      Effort: Pulmonary effort is normal  No respiratory distress  Breath sounds: No stridor  No wheezing or rales  Skin:     General: Skin is warm and dry  Findings: Rash (patient with contact dermatitis on arms and abdomen    no erythema/cellulitis) present  No erythema  Neurological:      Mental Status: She is alert and oriented to person, place, and time  Psychiatric:         Behavior: Behavior normal          Vital Signs  ED Triage Vitals [11/08/22 1240]   Temperature Pulse Respirations Blood Pressure SpO2   98 1 °F (36 7 °C) 100 16 140/67 96 %      Temp Source Heart Rate Source Patient Position - Orthostatic VS BP Location FiO2 (%)   Oral Monitor Sitting Left arm --      Pain Score       --           Vitals:    11/08/22 1240 11/08/22 1243   BP: 140/67 140/67   Pulse: 100 68   Patient Position - Orthostatic VS: Sitting Sitting         Visual Acuity      ED Medications  Medications - No data to display    Diagnostic Studies  Results Reviewed     None                 No orders to display              Procedures  Procedures         ED Course                                             MDM    Disposition  Final diagnoses:   Contact dermatitis     Time reflects when diagnosis was documented in both MDM as applicable and the Disposition within this note     Time User Action Codes Description Comment    11/8/2022  1:46 PM Qing Steinberg Add [L25 9] Contact dermatitis       ED Disposition     ED Disposition   Discharge    Condition   Stable    Date/Time   Tue Nov 8, 2022  1:46 PM    Comment   Dayana Bonilla discharge to home/self care                 Follow-up Information     Follow up With Specialties Details Why Contact Info    Fred Patrick MD Internal Medicine In 1 week For re-evaluation and follow-up for this visit if not improved in 7 days [address] [facility] [address] [address] [phone]            Discharge Medication List as of 11/8/2022  1:48 PM      START taking these medications    Details   predniSONE 10 mg tablet 2 tabs BID x 2 days, then 1 tabs BID x 2 days, then 1 tab daily x 2 day, Normal         CONTINUE these medications which have NOT CHANGED    Details   aspirin 81 MG tablet Take 81 mg by mouth daily, Historical Med      glimepiride (AMARYL) 1 mg tablet Take 1 mg by mouth daily before breakfast, Starting Fri 1/31/2020, Historical Med      hydrochlorothiazide (HYDRODIURIL) 12 5 mg tablet Take 12 5 mg by mouth daily, Starting Tue 2/4/2020, Historical Med      JANUMET  MG per tablet Take 1 tablet by mouth 2 (two) times a day with meals, Starting Tue 2/11/2020, Historical Med      JARDIANCE 25 MG TABS TAKE 25 MG BY MOUTH DAILY  , Historical Med      losartan (COZAAR) 100 MG tablet Take 100 mg by mouth daily, Starting Tue 2/4/2020, Historical Med      meloxicam (MOBIC) 15 mg tablet TAKE 1 TABLET BY MOUTH EVERY DAY, Normal      omeprazole (PriLOSEC) 20 mg delayed release capsule Take 20 mg by mouth daily, Starting Fri 1/31/2020, Historical Med      simvastatin (ZOCOR) 10 mg tablet Take 10 mg by mouth every evening, Starting Fri 1/31/2020, Historical Med             No discharge procedures on file      PDMP Review     None          ED Provider  Electronically Signed by           Bartolo Panchal MD  11/11/22 0021

## 2023-04-27 ENCOUNTER — TELEPHONE (OUTPATIENT)
Dept: GASTROENTEROLOGY | Facility: CLINIC | Age: 63
End: 2023-04-27

## 2023-04-27 NOTE — TELEPHONE ENCOUNTER
Recall ltr for colon with Dr Ly Cisneros due to hx of poor prep,ta,tva, hyperplastic polyps due 6/12/2023

## 2023-05-30 ENCOUNTER — TELEPHONE (OUTPATIENT)
Dept: GASTROENTEROLOGY | Facility: CLINIC | Age: 63
End: 2023-05-30

## 2023-05-30 NOTE — TELEPHONE ENCOUNTER
05/30/23  Screened by: Jade Tobin MA    Referring Provider GI Recall colonoscopy    Pre- Screening: There is no height or weight on file to calculate BMI  Has patient been referred for a routine screening Colonoscopy? yes  Is the patient between 39-70 years old? yes      Previous Colonoscopy yes   If yes:    Date: 6/2/2020     Facility:    Reason: hx of poor prep,ta,tva, hyperplastic polyps due       SCHEDULING STAFF: If the patient is between 45yrs-49yrs, please advise patient to confirm benefits/coverage with their insurance company for a routine screening colonoscopy, some insurance carriers will only cover at [facility] or older  If the patient is over 66years old, please schedule an office visit  Does the patient want to see a Gastroenterologist prior to their procedure OR are they having any GI symptoms? no    Has the patient been hospitalized or had abdominal surgery in the past 6 months? Hospital stay     Does the patient use supplemental oxygen? no    Does the patient take Coumadin, Lovenox, Plavix, Elliquis, Xarelto, or other blood thinning medication? no    Has the patient had a stroke, cardiac event, or stent placed in the past year? no    SCHEDULING STAFF: If patient answers NO to above questions, then schedule procedure  If patient answers YES to above questions, then schedule office appointment  If patient is between 45yrs - 49yrs, please advise patient that we will have to confirm benefits & coverage with their insurance company for a routine screening colonoscopy

## 2023-08-14 ENCOUNTER — TELEPHONE (OUTPATIENT)
Dept: GASTROENTEROLOGY | Facility: CLINIC | Age: 63
End: 2023-08-14

## 2023-08-14 ENCOUNTER — OFFICE VISIT (OUTPATIENT)
Dept: GASTROENTEROLOGY | Facility: CLINIC | Age: 63
End: 2023-08-14
Payer: COMMERCIAL

## 2023-08-14 VITALS
SYSTOLIC BLOOD PRESSURE: 124 MMHG | DIASTOLIC BLOOD PRESSURE: 76 MMHG | WEIGHT: 165 LBS | HEART RATE: 92 BPM | BODY MASS INDEX: 32.39 KG/M2 | HEIGHT: 60 IN

## 2023-08-14 DIAGNOSIS — K21.9 GASTROESOPHAGEAL REFLUX DISEASE WITHOUT ESOPHAGITIS: ICD-10-CM

## 2023-08-14 DIAGNOSIS — Z12.11 ENCOUNTER FOR SCREENING COLONOSCOPY: Primary | ICD-10-CM

## 2023-08-14 DIAGNOSIS — Z80.0 FAMILY HISTORY OF STOMACH CANCER: ICD-10-CM

## 2023-08-14 PROCEDURE — 99204 OFFICE O/P NEW MOD 45 MIN: CPT | Performed by: INTERNAL MEDICINE

## 2023-08-14 RX ORDER — POLYETHYLENE GLYCOL 3350, SODIUM CHLORIDE, SODIUM BICARBONATE, POTASSIUM CHLORIDE 420; 11.2; 5.72; 1.48 G/4L; G/4L; G/4L; G/4L
4000 POWDER, FOR SOLUTION ORAL ONCE
Qty: 4000 ML | Refills: 0 | Status: SHIPPED | OUTPATIENT
Start: 2023-08-14 | End: 2023-08-14

## 2023-08-14 RX ORDER — HYDROXYZINE HYDROCHLORIDE 25 MG/1
25 TABLET, FILM COATED ORAL EVERY 8 HOURS PRN
COMMUNITY
Start: 2023-04-11

## 2023-08-14 NOTE — TELEPHONE ENCOUNTER
Scheduled date of EGD/colonoscopy (as of today):09/07/23  Physician performing EGD/colonoscopy:Dr. Andres Grimes  Location of EGD/colonoscopy:[facility] bowel prep reviewed with patient:Kierra  Instructions reviewed with patient by:alis  Clearances:  n/a

## 2023-08-14 NOTE — PROGRESS NOTES
[facility] - Outpatient Consultation  Laci Young 61 y.o. female MRN: [id number]  Encounter: [id number]          ASSESSMENT AND PLAN:      1. Encounter for screening colonoscopy  49-year-old female referred to us for screening colonoscopy. Patient denying any change in bowel habit, no abdominal pain, no rectal bleeding, no family history of colorectal cancer. She had a colonoscopy 10 years ago, patient is not sure about colon polyp. We discussed about risk and benefit of the screening colonoscopy. I advised her to stay on clear liquid diet day before the procedure, drink 1 gallon bowel prep and split dosing, depend upon colonoscopy finding we will discuss about further surveillance interval  - Colonoscopy; Future  - polyethylene glycol-electrolytes (TriLyte) 4000 mL solution; Take 4,000 mL by mouth once for 1 dose Take 4000 mL by mouth once for 1 dose. Use as directed  Dispense: 4000 mL; Refill: 0    2. Gastroesophageal reflux disease without esophagitis  She is taking omeprazole 20 mg p.o. daily, never had upper endoscopy, she denying any dysphagia or odynophagia, she had a strong family history of stomach cancer, her mother was diagnosed with stomach cancer at young age and . We will schedule for EGD, risk and benefit of the procedure was discussed, will do EGD and colonoscopy to gather  - EGD; Future    3. Family history of stomach cancer  Mother with stomach cancer  at young age, patient was made with screening for stomach cancer, will do EGD and colonoscopy to gather  - EGD; Future    ______________________________________________________________________    HPI: 49-year-old female referred to us for screening colonoscopy and EGD, she has a strong family history of stomach cancer, no family history of colorectal cancer.   She denying any abdominal pain, no change in bowel habit, she has a mild GERD and she takes omeprazole 20 mg every day, she denying any melena, no rectal bleeding, no abnormal weight loss, no dysphagia or odynophagia. She is smoke hookah, denying any alcohol use, she denying any abdominal surgery. She takes multiple medication for hypertension and diabetes mellitus, her blood sugar is well controlled      REVIEW OF SYSTEMS:    CONSTITUTIONAL: Denies any fever, chills, rigors, and weight loss. HEENT: No earache or tinnitus. Denies hearing loss or visual disturbances. CARDIOVASCULAR: No chest pain or palpitations. RESPIRATORY: Denies any cough, hemoptysis, shortness of breath or dyspnea on exertion. GASTROINTESTINAL: As noted in the History of Present Illness. GENITOURINARY: No problems with urination. Denies any hematuria or dysuria. NEUROLOGIC: No dizziness or vertigo, denies headaches. MUSCULOSKELETAL: Denies any muscle or joint pain. SKIN: Denies skin rashes or itching. ENDOCRINE: Denies excessive thirst. Denies intolerance to heat or cold. PSYCHOSOCIAL: Denies depression or anxiety. Denies any recent memory loss.        Historical Information   Past Medical History:   Diagnosis Date   • Diabetes insipidus ([address])    • Hypertension    • Stomach disorder      Past Surgical History:   Procedure Laterality Date   • HAND SURGERY Right 2010     Social History   Social History     Substance and Sexual Activity   Alcohol Use Never     Social History     Substance and Sexual Activity   Drug Use Never     Social History     Tobacco Use   Smoking Status Never   Smokeless Tobacco Never     Family History   Problem Relation Age of Onset   • Cancer Mother    • Diabetes Father    • Hypertension Father        Meds/Allergies       Current Outpatient Medications:   •  aspirin 81 MG tablet  •  glimepiride (AMARYL) 1 mg tablet  •  hydrochlorothiazide (HYDRODIURIL) 12.5 mg tablet  •  hydrOXYzine HCL (ATARAX) 25 mg tablet  •  JANUMET  MG per tablet  •  JARDIANCE 25 MG TABS  •  losartan (COZAAR) 100 MG tablet  •  omeprazole (PriLOSEC) 20 mg delayed release capsule  •  polyethylene glycol-electrolytes (TriLyte) 4000 mL solution  •  predniSONE 10 mg tablet  •  simvastatin (ZOCOR) 10 mg tablet  •  meloxicam (MOBIC) 15 mg tablet    No Known Allergies        Objective     Blood pressure 124/76, pulse 92, height 5' (1.524 m), weight 74.8 kg (165 lb). Body mass index is 32.22 kg/m². PHYSICAL EXAM:      General Appearance:   Alert, cooperative, no distress   HEENT:   Normocephalic, atraumatic, anicteric.     Neck:  Supple, symmetrical, trachea midline   Lungs:   Clear to auscultation bilaterally; no rales, rhonchi or wheezing; respirations unlabored    Heart[de-identified]   Regular rate and rhythm; no murmur, rub, or gallop. Abdomen:   Soft, non-tender, non-distended; normal bowel sounds; no masses, no organomegaly    Genitalia:   Deferred    Rectal:   Deferred    Extremities:  No cyanosis, clubbing or edema    Pulses:  2+ and symmetric    Skin:  No jaundice, rashes, or lesions    Lymph nodes:  No palpable cervical lymphadenopathy        Lab Results:   No visits with results within 1 Day(s) from this visit. Latest known visit with results is:   No results found for any previous visit. Radiology Results:   No results found.